# Patient Record
Sex: FEMALE
[De-identification: names, ages, dates, MRNs, and addresses within clinical notes are randomized per-mention and may not be internally consistent; named-entity substitution may affect disease eponyms.]

---

## 2019-10-12 PROBLEM — Z00.00 ENCOUNTER FOR PREVENTIVE HEALTH EXAMINATION: Status: ACTIVE | Noted: 2019-10-12

## 2019-10-17 ENCOUNTER — APPOINTMENT (OUTPATIENT)
Dept: GYNECOLOGIC ONCOLOGY | Facility: CLINIC | Age: 34
End: 2019-10-17
Payer: COMMERCIAL

## 2019-10-17 PROCEDURE — 57455 BIOPSY OF CERVIX W/SCOPE: CPT | Mod: 59

## 2019-10-17 PROCEDURE — 99204 OFFICE O/P NEW MOD 45 MIN: CPT | Mod: 25

## 2019-10-20 RX ORDER — FERROUS SULFATE TAB EC 325 MG (65 MG FE EQUIVALENT) 325 (65 FE) MG
325 (65 FE) TABLET DELAYED RESPONSE ORAL
Qty: 30 | Refills: 0 | Status: DISCONTINUED | COMMUNITY
Start: 2019-10-11

## 2019-10-20 NOTE — END OF VISIT
[FreeTextEntry3] : This note accurately reflects the work and decisions made by me. \par Written by Angeles Das PA-C acting as a scribe for Dr. Alma Villagomez.

## 2019-10-20 NOTE — ASSESSMENT
[FreeTextEntry1] : This 32 y/o female currently 20 weeks pregnant being referred for PAP showing ASCUS with MELISSA 2 noted on biopsy. Discussed with the patient that her colposcopy done in the office today shows HPV related changes but no evidence of lymphovascular invasion or MELISSA 3. I recommend repeat towards the end of her pregnancy, around January. I discussed that I will then see her 6 weeks post partum where a biopsy will be done. Discussed briefly that a LEEP procedure may not be necessary based off of physical examination and biopsy results at that time. \par \par We discussed the etiology of cervical dysplasia and the relationship between the human papilloma virus (HPV) and cervical dysplsia and cancer.  I explained that anogenital transmission of HPV normally follows mucosal-to-mucosal contact.  I also explained what is understood to date with regards to how the HPV virus causes the pathological abnormailities that are seen in cervical dysplasia and cancer.  In addition to HPV, we discussed other risk factors such as smoking, use of oral contraceptives, and induced or acquired immune dysfunction.\par

## 2019-10-20 NOTE — HISTORY OF PRESENT ILLNESS
[FreeTextEntry1] : This 33  female currently 20 weeks pregnant, AMANDA 3/5/19 being referred by PHIL Wells for abnormal PAP and biopsy. 2019 PAP with ASCUS. 2019 ECC negative, cervical biopsy with MELISSA 2. Patient recommended a LEEP at that time but due to insurance purposes it was delayed and patient is now pregnant. Denies post coital bleeding, vaginal pains and changes in her bowel and bladder habits. Patient denies history of Gardasil injection. Admits she has had a healthy pregnancy with no complications thus far. \par \par Denies history of abnormal PAP.

## 2019-10-20 NOTE — CHIEF COMPLAINT
[FreeTextEntry1] : [place] [facility]\par \par [facility] [phone] at [address] [place] [address]

## 2019-10-20 NOTE — PHYSICAL EXAM
[Normal] : No focal neurologic defects observed [de-identified] : fundus measuring slightly small for gestational age, slightly below the umbilicus  [de-identified] : Patient was interviewed and examined with chaperone present. Name of Chaperone: Angeles Das PA-C

## 2019-10-20 NOTE — PROCEDURE
[Colposcopy] : colposcopy [ASCUS] : ASCUS [Patient] : the patient [SCJ Fully Visualized] : the squamocolumnar junction was fully visualized [No Abnormalities] : no abnormalities seen [No Complications] : none [Tolerated Well] : the patient tolerated the procedure well [Cervical Pap Performed] : a cervical pap smear was not performed [Vaginal Pap Performed] : no vaginal pap smear was performed [Biopsies Taken: # ___] : no biopsies were taken of the cervix [ECC Done] : an endocervical curettage was not performed [Endometrial Biopsy Done] : an endometrial biopsy was not performed [FreeTextEntry1] : Transformation zone was visualized. 3-4mm band around the transformation zone noted with acetowhite changes. No changes noted near or extending into the canal. No vascular changes noted.

## 2020-01-16 ENCOUNTER — APPOINTMENT (OUTPATIENT)
Dept: GYNECOLOGIC ONCOLOGY | Facility: CLINIC | Age: 35
End: 2020-01-16
Payer: COMMERCIAL

## 2020-01-16 VITALS
TEMPERATURE: 98 F | OXYGEN SATURATION: 95 % | DIASTOLIC BLOOD PRESSURE: 68 MMHG | SYSTOLIC BLOOD PRESSURE: 102 MMHG | HEART RATE: 78 BPM

## 2020-01-16 PROCEDURE — 57452 EXAM OF CERVIX W/SCOPE: CPT | Mod: 59

## 2020-01-16 PROCEDURE — 99213 OFFICE O/P EST LOW 20 MIN: CPT | Mod: 25

## 2020-01-16 NOTE — ASSESSMENT
[FreeTextEntry1] : Pt is a 35 yo G1 in second trimester with no concern for invasive disease. Cervix is very friable and pressure used for hemostasis, no concern for progression of dysplasia today. Will evaluate 6 weeks post-partum.

## 2020-01-16 NOTE — HISTORY OF PRESENT ILLNESS
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 She reports her placenta previa resolved on the last US. She has no new concerns.

## 2020-01-16 NOTE — PROCEDURE
[Colposcopy] : colposcopy [Verbal Consent] : verbal consent was obtained prior to the procedure and is detailed in the patient's record [Cervical Dysplasia] : cervical dysplasia [Acetowhite ___ o'clock] : ascetowhite changes at the [unfilled] ~Uo'clock position [No Complications] : none [Leukoplakia ___ o'clock] : no leukoplakia [Tolerated Well] : the patient tolerated the procedure well

## 2020-01-16 NOTE — PHYSICAL EXAM
[Normal] : Anus and perineum: Normal sphincter tone, no masses, no prolapse. [de-identified] : + gravid abdomen above umbilicus

## 2020-06-09 ENCOUNTER — APPOINTMENT (OUTPATIENT)
Dept: GYNECOLOGIC ONCOLOGY | Facility: CLINIC | Age: 35
End: 2020-06-09
Payer: COMMERCIAL

## 2020-06-09 DIAGNOSIS — N87.1 MODERATE CERVICAL DYSPLASIA: ICD-10-CM

## 2020-06-09 PROCEDURE — 57452 EXAM OF CERVIX W/SCOPE: CPT | Mod: 59

## 2020-06-09 PROCEDURE — 99214 OFFICE O/P EST MOD 30 MIN: CPT | Mod: 25

## 2020-06-09 NOTE — PROCEDURE
[Abnormal Pap] : an abnormal pap smear [Colposcopy] : colposcopy [Cervical Pap Performed] : a cervical pap smear was not performed [Patient] : the patient [No Abnormalities] : no abnormalities [ECC Done] : an endocervical curettage was not performed [Biopsies Taken: # ___] : no biopsies were taken of the cervix [No Complications] : none [0] : 0 [Tolerated Well] : the patient tolerated the procedure well

## 2020-06-09 NOTE — PHYSICAL EXAM
[Normal] : Anus and perineum: Normal sphincter tone, no masses, no prolapse. [de-identified] : +entire transformation zone visualized, no acetowhite lesions, no atypical vessels [Fully active, able to carry on all pre-disease performance without restriction] : Status 0 - Fully active, able to carry on all pre-disease performance without restriction

## 2020-06-09 NOTE — HISTORY OF PRESENT ILLNESS
[FreeTextEntry1] : Pt is a 35 yo  with CIN2 now post-partum and presents for exam. She reports no concerns, no vaginal bleeding or dicharge. She had an uneventful  of a healthy boy.

## 2020-06-09 NOTE — ASSESSMENT
[FreeTextEntry1] : Pt is a 33 yo  s/p  previously with CIN2 now with no evidence of dysplasia. Recommend PAP 1 year from previous which will be 2020. Follow up as needed on results.